# Patient Record
Sex: MALE | Race: ASIAN | URBAN - METROPOLITAN AREA
[De-identification: names, ages, dates, MRNs, and addresses within clinical notes are randomized per-mention and may not be internally consistent; named-entity substitution may affect disease eponyms.]

---

## 2017-10-31 ENCOUNTER — OFFICE VISIT (OUTPATIENT)
Dept: FAMILY MEDICINE | Facility: CLINIC | Age: 27
End: 2017-10-31

## 2017-10-31 VITALS
SYSTOLIC BLOOD PRESSURE: 111 MMHG | DIASTOLIC BLOOD PRESSURE: 68 MMHG | HEART RATE: 86 BPM | WEIGHT: 195 LBS | HEIGHT: 68 IN | RESPIRATION RATE: 19 BRPM | OXYGEN SATURATION: 94 % | BODY MASS INDEX: 29.55 KG/M2 | TEMPERATURE: 97.8 F

## 2017-10-31 DIAGNOSIS — M10.9 GOUT OF MULTIPLE SITES, UNSPECIFIED CAUSE, UNSPECIFIED CHRONICITY: Primary | ICD-10-CM

## 2017-10-31 LAB — URATE SERPL-MCNC: 8 MG/DL (ref 3–8)

## 2017-10-31 RX ORDER — ALLOPURINOL 100 MG/1
100 TABLET ORAL DAILY
Qty: 30 TABLET | Refills: 1 | Status: SHIPPED | OUTPATIENT
Start: 2017-10-31 | End: 2017-11-21

## 2017-10-31 RX ORDER — NAPROXEN 500 MG/1
500 TABLET ORAL 2 TIMES DAILY WITH MEALS
Qty: 60 TABLET | Refills: 1 | Status: SHIPPED | OUTPATIENT
Start: 2017-10-31 | End: 2017-11-21

## 2017-10-31 NOTE — PROGRESS NOTES
"       HPI:       Nikki Troy is a 27 year old  male with a significant past medical history of Gout who presents for the new concern(s) of    Right Wrist Swelling  About 5 days ago, he noticed his right wrist starting to swell and became painful both to movement and touch. Described the pain as an achiness localized to his wrist which worsen with touch and wrist movement. Notice swelling of the right wrist and hand compared to his left hand. Has trouble with gripping due to pain but denies any weakness or numbness. Tried 3 Aleve without relief. Denies any trauma or injuries. History of similar swelling in the past with previous episode of gout attacks. Was on allopurinol for several years but stopped 1 month ago when his friend recommended \"[place]onchi Water\" for gout prevention. He was also previously on numerous Hmong herbs but stopped 1 month ago. Was seen by Dr Nair several months ago and was given prednisone but decided to stop given research at home and concerns about side effects. Would like to avoid steroid if possible. Has been working on his diet (eating less pork, but more difficulties due to eating fast food while work as ). Has cut down his EtOH use to 1-2 beer once per month. Extensive family history of gout in maternal and paternal fathers and siblings.         PMHX:     There is no problem list on file for this patient.      Current Outpatient Prescriptions   Medication Sig Dispense Refill     naproxen (NAPROSYN) 500 MG tablet Take 1 tablet (500 mg) by mouth 2 times daily (with meals) 60 tablet 1     allopurinol (ZYLOPRIM) 100 MG tablet Take 1 tablet (100 mg) by mouth daily 30 tablet 1       Social History     Social History     Marital status: Single     Spouse name: N/A     Number of children: N/A     Years of education: N/A     Occupational History     Not on file.     Social History Main Topics     Smoking status: Current Every Day Smoker     Smokeless tobacco: Not on " "file     Alcohol use Not on file     Drug use: Not on file     Sexual activity: Not on file     Other Topics Concern     Not on file     Social History Narrative     No narrative on file        No Known Allergies    No results found for this or any previous visit (from the past 24 hour(s)).         Review of Systems:   C: NEGATIVE for fatigue, unexpected change in weight  E: NEGATIVE for acute vision problems or changes  R: NEGATIVE for significant cough or shortness of breath  CV: NEGATIVE for chest pain, palpitations or new or worsening peripheral edema  GI: NEGATIVE for new onset or worsening nausea, vomiting or diarrhea  : NEGATIVE for frequency, dysuria, or hematuria  MUSCULOSKELETAL: Right Wrist (see HPI)  N: NEGATIVE for weakness, dizziness, syncope, headaches  E: NEGATIVE for temperature intolerance, skin/hair changes  P: NEGATIVE for changes in mood or affect          Physical Exam:     Vitals:    10/31/17 1017   BP: 111/68   Pulse: 86   Resp: 19   Temp: 97.8  F (36.6  C)   TempSrc: Oral   SpO2: 94%   Weight: 195 lb (88.5 kg)   Height: 5' 8\" (172.7 cm)     Body mass index is 29.65 kg/(m^2).    GENERAL APPEARANCE: healthy, alert and no distress,  EYES: Eyes grossly normal to inspection,  PERRL  NECK: no adenopathy, no asymmetry, masses, or scars and thyroid normal to palpation  RESP: lungs clear to auscultation - no rales, rhonchi or wheezes  CV: regular rate and rhythm,  and no murmur, click,  rub or gallop  ABDOMEN: soft, nontender, without hepatosplenomegaly or masses  MS: right wrist: non-pitting edema radiating to the fingers, sever pain with all wrist ROM, left wrist normal, no skin changes  NEURO: Normal strength and tone, sensory exam grossly normal, mentation appears intact and speech normal  PSYCH: mood and affect normal/bright      Assessment and Plan     1. Gout of multiple sites, unspecified cause, unspecified chronicity  Swollen right wrist with decrease ROM due to pain. Hx of gout with " extensive family history. Previously controlled on allopurinol but has since discontinued. Diagnosed with Gout several years ago and recently seen by previous PCP with prednisone but discontinued due to concerns when researching medications. No hx of trauma, fall or injuries. Will treat acutely with NSAID with plans to restart allopurinol when acute gout resolves.  - Uric Acid ([facility])  - naproxen (NAPROSYN) 500 MG tablet; Take 1 tablet (500 mg) by mouth 2 times daily (with meals)  Dispense: 60 tablet; Refill: 1  - allopurinol (ZYLOPRIM) 100 MG tablet; Take 1 tablet (100 mg) by mouth daily  Dispense: 30 tablet; Refill: 1    Options for treatment and follow-up care were reviewed with the patient and/or guardian. Nikki Troy and/or guardian engaged in the decision making process and verbalized understanding of the options discussed and agreed with the final plan.    Paulo Miranda MD  [facility]  Pager: [phone]    Precepted today with: Lissette Ashraf DO

## 2017-10-31 NOTE — LETTER
November 3, 2017      Nikki Leónminor  [address]  [place] [address]        Dear Nikki,    Thank you for visiting our clinic. The result of your most recent labs (Uric Acid) has returned and was 8.0 which is the upper limit of normal. We obtained this value as a baseline but would like to recheck in several weeks when you are back on Allopurinol for some time. Our target goal of your uric acid level should be below 6.0 which the allopurinol will help. We will discuss this further at your next visit and look forward to seeing you again.    Please see below for your test results.    Resulted Orders   Uric Acid ([facility])   Result Value Ref Range    Uric Acid 8.0 3.0 - 8.0 mg/dL    Narrative    Test performed by:  [facility]  [address] [place] [address]       If you have any questions, please call the clinic to make an appointment.    Sincerely,    Paulo Miranda MD

## 2017-10-31 NOTE — MR AVS SNAPSHOT
After Visit Summary   10/31/2017    Nikki Troy    MRN: [id number]           Patient Information     Date Of Birth          1990        Visit Information        Provider Department      10/31/2017 10:20 AM Paulo Miranda MD [facility]        Today's Diagnoses     Gout of multiple sites, unspecified cause, unspecified chronicity    -  1      Care Instructions    Take Naproxen twice per day with food until gout symptoms resolved. Take Allopurinol when stable and follow up in clinic in 3 weeks.      Treating Gout Attacks     Raising the joint above the level of your heart can help reduce gout symptoms.     Gout is a disease that affects the joints. It is caused by excess uric acid in your blood stream that may lead to crystals forming in your joints. Left untreated, it can lead to painful foot and joint deformities and even kidney problems. But, by treating gout early, you can relieve pain and help prevent future problems. Gout can usually be treated with medication and proper diet. In severe cases, surgery may be needed.  Gout attacks are painful and often happen more than once. Taking medications may reduce pain and prevent attacks in the future. There are also some things you can do at home to relieve symptoms.  Medications for gout  Your healthcare provider may prescribe a daily medication to reduce levels of uric acid. Reducing your uric acid levels may help prevent gout attacks. Allopurinol is one commonly used medication taken daily to reduce uric acid levels. Other medications can help relieve pain and swelling during an acute attack. Medicines such as NSAIDs (nonsteroidal anti-inflammatory medicines), steroids, and colchicine may be prescribed for intermittent use to relieve an acute gout attack. Be sure to take your medication as directed.  What you can do  Below are some things you can do at home to relieve gout symptoms. Your healthcare provider may have other  tips.    Rest the painful joint as much as you can.    Raise the painful joint so it is at a level higher than your heart.    Use ice for 10 minutes every 1-2 hours as possible.  How can I prevent gout?  With a little effort, you may be able to prevent gout attacks in the future. Here are some things you can do:    Avoid foods high in purines    Certain meats (red meat, processed meat, turkey)    Organ meats (kidney, liver, sweetbread)    Shellfish (lobster, crab, shrimp, scallop, mussel)    Certain fish (anchovy, sardine, herring, mackerel)    Take any medications prescribed by your healthcare provider.    Lose weight if you need to.    Reduce high fructose corn syrup in meals and drinks.    Reduce or eliminate consumption of alcohol, particularly beer, but also red wine and spirits.    Control blood pressure, diabetes, and cholesterol.    Drink plenty of water to help flush uric acid from your body.  Date Last Reviewed: 2/1/2016 2000-2017 [employer]. [address] [place] [address]. All rights reserved. This information is not intended as a substitute for professional medical care. Always follow your healthcare professional's instructions.        Eating to Prevent Gout  Gout is a painful form of arthritis caused by an excess of uric acid. This is a waste product made by the body. It builds up in the body and forms crystals that collect in the joints, bringing on a gout attack. Alcohol and certain foods can trigger a gout attack. Below are some guidelines for changing your diet to help you manage gout. Your healthcare provider can work with you to determine the best eating plan for you. Know that diet is only one part of managing gout. Take your medicines as prescribed and follow the other guidelines your healthcare provider has given you.  Foods to limit  Eating too many foods containing purines may increase the levels of uric acid in your body and increase your risk for a gout attack.  It may be best to limit these high-purine foods:    Alcohol (beer, red wine). You may be told to avoid alcohol completely.    Certain fish (anchovies, sardines, fish roes, herring, tuna, mussels, codfish, scallops, trout, and ze)    Certain meats (red meat, processed meat, rosas, turkey, wild game, and goose)    Sauces and gravies made with meat    Organ meats (such as liver, kidneys, sweetbreads, and tripe)    Legumes (such as dried beans, peas)    Mushrooms, spinach, asparagus, and cauliflower    Yeast and yeast extract supplements  Foods to try  Some foods may be helpful for people with gout. You may want to try adding some of the following foods to your diet:    Dark berries: These include blueberries, blackberries, and cherries. These berries contain chemicals that may lower uric acid.    Tofu: Tofu, which is made from soy, is a good source of protein. Studies have shown that it may be a better choice than meat for people with gout.    Omega fatty acids: These acids are found in fatty fish (such as salmon), certain oils (such as flax, olive, or nut oils), or nuts. They may help prevent inflammation due to gout.  The following guidelines are recommended by the American Medical Association for people with gout. Your diet should be:    High in fiber, whole grains, fruits, and vegetables.    Low in protein (15% of calories should come from protein. Choose lean sources such as soy, lean meats, and poultry).    Low in fat (no more than 30% of calories should come from fat, with only 10% coming from animal fat).   Date Last Reviewed: 6/17/2015 2000-2017 [employer]. [address] [place] [address], [place] [address]. All rights reserved. This information is not intended as a substitute for professional medical care. Always follow your healthcare professional's instructions.                Follow-ups after your visit        Follow-up notes from your care team     Return in about 3 weeks (around 11/21/2017)  "for Gout.      Who to contact     Please call your clinic at [phone] to:    Ask questions about your health    Make or cancel appointments    Discuss your medicines    Learn about your test results    Speak to your doctor   If you have compliments or concerns about an experience at your clinic, or if you wish to file a complaint, please contact [facility] Patient Relations at [phone] or email us at [email or web address]         Additional Information About Your Visit        [employer] Information     "[employer] is an electronic gateway that provides easy, online access to your medical records. With "[employer] you can request a clinic appointment, read your test results, renew a prescription or communicate with your care team.     To sign up for "[employer] visit the website at [email or web address]   You will be asked to enter the access code listed below, as well as some personal information. Please follow the directions to create your username and password.     Your access code is: [email or web address]  Expires: 2018 10:21 AM     Your access code will  in 90 days. If you need help or a new code, please contact your [facility] or call [phone] for assistance.        Care EveryWhere ID     This is your Care EveryWhere ID. This could be used by other organizations to access your [facility] medical records  [id number]        Your Vitals Were     Pulse Temperature Respirations Height Pulse Oximetry BMI (Body Mass Index)    86 97.8  F (36.6  C) (Oral) 19 5' 8\" (172.7 cm) 94% 29.65 kg/m2       Blood Pressure from Last 3 Encounters:   10/31/17 111/68    Weight from Last 3 Encounters:   10/31/17 195 lb (88.5 kg)              We Performed the Following     Uric Acid ([employer])          Today's Medication Changes          These changes are accurate as of: 10/31/17 11:25 AM.  If you have any questions, ask your nurse or doctor.             "   Start taking these medicines.        Dose/Directions    allopurinol 100 MG tablet   Commonly known as:  ZYLOPRIM   Used for:  Gout of multiple sites, unspecified cause, unspecified chronicity   Started by:  Paulo Miranda MD        Dose:  100 mg   Take 1 tablet (100 mg) by mouth daily   Quantity:  30 tablet   Refills:  1       naproxen 500 MG tablet   Commonly known as:  NAPROSYN   Used for:  Gout of multiple sites, unspecified cause, unspecified chronicity   Started by:  Paulo Miranda MD        Dose:  500 mg   Take 1 tablet (500 mg) by mouth 2 times daily (with meals)   Quantity:  60 tablet   Refills:  1            Where to get your medicines      These medications were sent to [facility] - [place], [place] - [address], [place] [address]     Phone:  [phone]     allopurinol 100 MG tablet    naproxen 500 MG tablet                Primary Care Provider Office Phone # Fax #    Paulo Miranda [phone]       [address] [address]        Equal Access to Services     [facility] Center[facility] : Hadii agustin ku hadasho Soomaali, waaxda luqadaha, qaybta kaalmada adeegyada, mary doan hayelian mansi barahona . So [facility] [phone].    ATENCIÓN: Si habla español, tiene a giles disposición servicios gratuitos de asistencia lingüística. [facility] [phone].    We comply with applicable federal civil rights laws and [place] laws. We do not discriminate on the basis of race, color, national origin, age, disability, sex, sexual orientation, or gender identity.            Thank you!     Thank you for choosing [facility]  for your care. Our goal is always to provide you with excellent care. Hearing back from our patients is one way we can continue to improve our services. Please take a few minutes to complete the written survey that you may receive in the mail after your visit with us.  Thank you!             Your Updated Medication List - Protect others around you: Learn how to safely use, store and throw away your medicines at www.disposemymeds.org.          This list is accurate as of: 10/31/17 11:25 AM.  Always use your most recent med list.                   Brand Name Dispense Instructions for use Diagnosis    allopurinol 100 MG tablet    ZYLOPRIM    30 tablet    Take 1 tablet (100 mg) by mouth daily    Gout of multiple sites, unspecified cause, unspecified chronicity       naproxen 500 MG tablet    NAPROSYN    60 tablet    Take 1 tablet (500 mg) by mouth 2 times daily (with meals)    Gout of multiple sites, unspecified cause, unspecified chronicity

## 2017-10-31 NOTE — PATIENT INSTRUCTIONS
Take Naproxen twice per day with food until gout symptoms resolved. Take Allopurinol when stable and follow up in clinic in 3 weeks.      Treating Gout Attacks     Raising the joint above the level of your heart can help reduce gout symptoms.     Gout is a disease that affects the joints. It is caused by excess uric acid in your blood stream that may lead to crystals forming in your joints. Left untreated, it can lead to painful foot and joint deformities and even kidney problems. But, by treating gout early, you can relieve pain and help prevent future problems. Gout can usually be treated with medication and proper diet. In severe cases, surgery may be needed.  Gout attacks are painful and often happen more than once. Taking medications may reduce pain and prevent attacks in the future. There are also some things you can do at home to relieve symptoms.  Medications for gout  Your healthcare provider may prescribe a daily medication to reduce levels of uric acid. Reducing your uric acid levels may help prevent gout attacks. Allopurinol is one commonly used medication taken daily to reduce uric acid levels. Other medications can help relieve pain and swelling during an acute attack. Medicines such as NSAIDs (nonsteroidal anti-inflammatory medicines), steroids, and colchicine may be prescribed for intermittent use to relieve an acute gout attack. Be sure to take your medication as directed.  What you can do  Below are some things you can do at home to relieve gout symptoms. Your healthcare provider may have other tips.    Rest the painful joint as much as you can.    Raise the painful joint so it is at a level higher than your heart.    Use ice for 10 minutes every 1-2 hours as possible.  How can I prevent gout?  With a little effort, you may be able to prevent gout attacks in the future. Here are some things you can do:    Avoid foods high in purines    Certain meats (red meat, processed meat, turkey)    Organ meats  (kidney, liver, sweetbread)    Shellfish (lobster, crab, shrimp, scallop, mussel)    Certain fish (anchovy, sardine, herring, mackerel)    Take any medications prescribed by your healthcare provider.    Lose weight if you need to.    Reduce high fructose corn syrup in meals and drinks.    Reduce or eliminate consumption of alcohol, particularly beer, but also red wine and spirits.    Control blood pressure, diabetes, and cholesterol.    Drink plenty of water to help flush uric acid from your body.  Date Last Reviewed: 2/1/2016 2000-2017 [employer]. [address] [place], [place] [id number]. All rights reserved. This information is not intended as a substitute for professional medical care. Always follow your healthcare professional's instructions.        Eating to Prevent Gout  Gout is a painful form of arthritis caused by an excess of uric acid. This is a waste product made by the body. It builds up in the body and forms crystals that collect in the joints, bringing on a gout attack. Alcohol and certain foods can trigger a gout attack. Below are some guidelines for changing your diet to help you manage gout. Your healthcare provider can work with you to determine the best eating plan for you. Know that diet is only one part of managing gout. Take your medicines as prescribed and follow the other guidelines your healthcare provider has given you.  Foods to limit  Eating too many foods containing purines may increase the levels of uric acid in your body and increase your risk for a gout attack. It may be best to limit these high-purine foods:    Alcohol (beer, red wine). You may be told to avoid alcohol completely.    Certain fish (anchovies, sardines, fish roes, herring, tuna, mussels, codfish, scallops, trout, and ze)    Certain meats (red meat, processed meat, rosas, turkey, wild game, and goose)    Sauces and gravies made with meat    Organ meats (such as liver, kidneys, sweetbreads, and  tripe)    Legumes (such as dried beans, peas)    Mushrooms, spinach, asparagus, and cauliflower    Yeast and yeast extract supplements  Foods to try  Some foods may be helpful for people with gout. You may want to try adding some of the following foods to your diet:    Dark berries: These include blueberries, blackberries, and cherries. These berries contain chemicals that may lower uric acid.    Tofu: Tofu, which is made from soy, is a good source of protein. Studies have shown that it may be a better choice than meat for people with gout.    Omega fatty acids: These acids are found in fatty fish (such as salmon), certain oils (such as flax, olive, or nut oils), or nuts. They may help prevent inflammation due to gout.  The following guidelines are recommended by the American Medical Association for people with gout. Your diet should be:    High in fiber, whole grains, fruits, and vegetables.    Low in protein (15% of calories should come from protein. Choose lean sources such as soy, lean meats, and poultry).    Low in fat (no more than 30% of calories should come from fat, with only 10% coming from animal fat).   Date Last Reviewed: 6/17/2015 2000-2017 [employer]. [address] [place] [address], [place] [address]. All rights reserved. This information is not intended as a substitute for professional medical care. Always follow your healthcare professional's instructions.

## 2017-10-31 NOTE — LETTER
[employer] Customer Service  [facility]  [address] , [address]  [place] [address]  Fax: [phone]  Phone: [phone]      2017      Nikki Troy  [address] [place] [address]        Dear Nikki,    Thank you for your interest in becoming a [employer] user!    Your access code is: [email or web address]  Expires: 2018 10:21 AM     Please access the [employer] website at [email or web address].  Below the ID and password fields, select the  Sign Up Now  as New User.  You will be prompted to enter the access code listed above as well as additional personal information.  Please follow the directions carefully when creating your username and password.    If you allow your access code to , or if you have any questions please call a [employer] Representative at [phone] during normal clinic hours.     Sincerely,      [employer] Customer Service  [facility]

## 2017-10-31 NOTE — PROGRESS NOTES
Preceptor Attestation:  Patient's case reviewed and discussed with Paulo Miranda MD Patient seen and discussed with the resident.. I agree with assessment and plan of care.  Supervising Physician:  Lissette Ashraf DO  [facility]

## 2017-10-31 NOTE — LETTER
RETURN TO WORK/SCHOOL FORM    10/31/2017    Re: Nikki Troy  1990      To Whom It May Concern:     Nikki Troy was seen in clinic today.  He may return to work without restrictions on 11/6/17.        Restrictions:  None    Paulo Miranda MD  10/31/2017 11:27 AM

## 2017-11-21 ENCOUNTER — OFFICE VISIT (OUTPATIENT)
Dept: FAMILY MEDICINE | Facility: CLINIC | Age: 27
End: 2017-11-21

## 2017-11-21 VITALS
RESPIRATION RATE: 18 BRPM | BODY MASS INDEX: 29.4 KG/M2 | HEIGHT: 68 IN | TEMPERATURE: 97.7 F | WEIGHT: 194 LBS | OXYGEN SATURATION: 97 % | HEART RATE: 68 BPM | DIASTOLIC BLOOD PRESSURE: 72 MMHG | SYSTOLIC BLOOD PRESSURE: 117 MMHG

## 2017-11-21 DIAGNOSIS — M10.9 GOUT OF MULTIPLE SITES, UNSPECIFIED CAUSE, UNSPECIFIED CHRONICITY: ICD-10-CM

## 2017-11-21 RX ORDER — ALLOPURINOL 100 MG/1
300 TABLET ORAL DAILY
Qty: 90 TABLET | Refills: 3 | Status: SHIPPED | OUTPATIENT
Start: 2017-11-21 | End: 2018-09-21

## 2017-11-21 RX ORDER — INDOMETHACIN 25 MG/1
50 CAPSULE ORAL
Qty: 84 CAPSULE | Refills: 0 | Status: SHIPPED | OUTPATIENT
Start: 2017-11-21

## 2017-11-21 NOTE — MR AVS SNAPSHOT
After Visit Summary   11/21/2017    Nikki Troy    MRN: [id number]           Patient Information     Date Of Birth          1990        Visit Information        Provider Department      11/21/2017 3:20 PM Pauline Montemayor MD [facility]        Today's Diagnoses     Gout of multiple sites, unspecified cause, unspecified chronicity          Care Instructions      Follow up 1 month after you are taking your allopurinol     Gout Diet  Gout is a painful condition caused by an excess of uric acid, a waste product made by the body. Uric acid forms crystals that collect in the joints. The immune response to these crystals brings on symptoms of joint pain and swelling. This is called a gout attack. Often, medications and diet changes are combined to manage gout. Below are some guidelines for changing your diet to help you manage gout and prevent attacks. Your health care provider will help you determine the best eating plan for you.     Eating to manage gout  Weight loss for those who are overweight may help reduce gout attacks.  Eat less of these foods  Eating too many foods containing purines may raise the levels of uric acid in your body. This raises your risk for a gout attack. Try to limit these foods and drinks:    Alcohol, such as beer and red wine. You may be told to avoid alcohol completely.    Soft drinks that contain sugar or high fructose corn syrup    Certain fish, including anchovies, sardines, fish eggs, and herring    Shellfish    Certain meats, such as red meat, hot dogs, luncheon meats, and turkey    Organ meats, such as liver, kidneys, and sweetbreads    Legumes, such as dried beans and peas    Other high fat foods such as gravy, whole milk, and high fat cheeses    Vegetables such as asparagus, cauliflower, spinach, and mushrooms used to be thought to contribute to an increased risk for a gout attack, but recent studies show that high purine vegetables don't increase the  risk for a gout attack.  Eat more of these foods  Other foods may be helpful for people with gout. Add some of these foods to your diet:    Cherries contain chemicals that may lower uric acid.    Omega fatty acids. These are found in some fatty fish such as salmon, certain oils (flax, olive, or nut), and nuts themselves. Omega fatty acids may help prevent inflammation due to gout.    Dairy products that are low-fat or fat-free, such as cheese and yogurt    Complex carbohydrate foods, including whole grains, brown rice, oats, and beans    Coffee, in moderation    Water, approximately 64 ounces per day  Follow-up care  Follow up with your healthcare provider as advised.  When to seek medical advice  Call your healthcare provider right away if any of these occur:    Return of gout symptoms, usually at night:    Severe pain, swelling, and heat in a joint, especially the base of the big toe    Affected joint is hard to move    Skin of the affected joint is purple or red    Fever of 100.4 F (38 C) or higher    Pain that doesn't get better even with prescribed medicine   Date Last Reviewed: 1/12/2016 2000-2017 [employer]. [address] [place] [address]. All rights reserved. This information is not intended as a substitute for professional medical care. Always follow your healthcare professional's instructions.                Follow-ups after your visit        Who to contact     Please call your clinic at [phone] to:    Ask questions about your health    Make or cancel appointments    Discuss your medicines    Learn about your test results    Speak to your doctor   If you have compliments or concerns about an experience at your clinic, or if you wish to file a complaint, please contact [facility] Patient Relations at [phone] or email us at [email or web address]         Additional Information About Your Visit        MyChart Information     MyChart is  "an electronic gateway that provides easy, online access to your medical records. With Cell[employer], you can request a clinic appointment, read your test results, renew a prescription or communicate with your care team.     To sign up for CellControl visit the website at [email or web address]   You will be asked to enter the access code listed below, as well as some personal information. Please follow the directions to create your username and password.     Your access code is: [email or web address]  Expires: 2018  9:21 AM     Your access code will  in 90 days. If you need help or a new code, please contact your [facility] or call [phone] for assistance.        Care EveryWhere ID     This is your Care EveryWhere ID. This could be used by other organizations to access your [facility] medical records  [id number]        Your Vitals Were     Pulse Temperature Respirations Height Pulse Oximetry BMI (Body Mass Index)    68 97.7  F (36.5  C) (Oral) 18 5' 7.5\" (171.5 cm) 97% 29.94 kg/m2       Blood Pressure from Last 3 Encounters:   17 117/72   10/31/17 111/68    Weight from Last 3 Encounters:   17 194 lb (88 kg)   10/31/17 195 lb (88.5 kg)              Today, you had the following     No orders found for display         Today's Medication Changes          These changes are accurate as of: 17  4:15 PM.  If you have any questions, ask your nurse or doctor.               Start taking these medicines.        Dose/Directions    indomethacin 25 MG capsule   Commonly known as:  INDOCIN   Used for:  Gout of multiple sites, unspecified cause, unspecified chronicity   Started by:  Pauline Montemayor MD        Dose:  50 mg   Take 2 capsules (50 mg) by mouth 3 times daily (with meals)   Quantity:  84 capsule   Refills:  0         Stop taking these medicines if you haven't already. Please contact your care team if you have questions.     naproxen 500 MG tablet   Commonly known as:  " TIAGO   Stopped by:  Pauline Montemayor MD                Where to get your medicines      [facility] - [place], [place] - [address] AT [facility] & [place] [address], [place] [address]     Phone:  [phone]     allopurinol 100 MG tablet    indomethacin 25 MG capsule                Primary Care Provider Office Phone # Fax #    Paulo Lucian Miranda [phone]       [address] [address]        Equal Access to Services     [facility]: Hadii aad ku hadasho Soomaali, waaxda luqadaha, qaybta kaalmada adeegyada, waxshirley handin hayabby barahona . So [facility] [phone].    ATENCIÓN: Si habla español, tiene a giles disposición servicios gratuitos de asistencia lingüística. [facility] [phone].    We comply with applicable federal civil rights laws and [place] laws. We do not discriminate on the basis of race, color, national origin, age, disability, sex, sexual orientation, or gender identity.            Thank you!     Thank you for choosing [facility]  for your care. Our goal is always to provide you with excellent care. Hearing back from our patients is one way we can continue to improve our services. Please take a few minutes to complete the written survey that you may receive in the mail after your visit with us. Thank you!             Your Updated Medication List - Protect others around you: Learn how to safely use, store and throw away your medicines at www.disposemymeds.org.          This list is accurate as of: 11/21/17  4:15 PM.  Always use your most recent med list.                   Brand Name Dispense Instructions for use Diagnosis    allopurinol 100 MG tablet    ZYLOPRIM    90 tablet    Take 3 tablets (300 mg) by mouth daily    Gout of multiple sites, unspecified cause, unspecified chronicity       indomethacin 25 MG capsule    INDOCIN    84 capsule    Take 2 capsules (50  mg) by mouth 3 times daily (with meals)    Gout of multiple sites, unspecified cause, unspecified chronicity

## 2017-11-21 NOTE — PROGRESS NOTES
"       HPI:       Nikki Troy is a 27 year old  male with a significant past medical history of gout who presents for follow up of concern(s) listed below    Gout  - Inflammation of right wrist. Feels like gout is flaring again. Was getting slightly better since his last clinic visit 10/31, but has since flared again. He thinks he was working too hard at work which caused the acute worsening.  - This flare started 10/26. Has had flares 3 times already this year. Last flare in July. Was diagnosed with gout at the age of 24. Previously had flares 3-4 times a year and his gout has become more chronic. Usually left or right big toe, but has had it in ankles, knees, and wrists prior.   - Currently taking Naproxen 1 tablet BID   - Was previously taking Allopurinol 300mg daily, but has not been taking this since September and knows he should not take this with his flares. Had stopped it 1 month prior to his attack as he wanted to try \"Jaylin water\".   - Diet and exercise has been helpful in the past to control.   - Prednisone has been helpful but does not like the side effects.   - Indomethacin has been helpful in the past and would like to try this again           PMHX:     Patient Active Problem List   Diagnosis     Gout of multiple sites, unspecified cause, unspecified chronicity     Current Outpatient Prescriptions   Medication Sig Dispense Refill     naproxen (NAPROSYN) 500 MG tablet Take 1 tablet (500 mg) by mouth 2 times daily (with meals) 60 tablet 1     allopurinol (ZYLOPRIM) 100 MG tablet Take 1 tablet (100 mg) by mouth daily (Patient taking differently: Take 300 mg by mouth daily ) 30 tablet 1      No Known Allergies    Uric Acid 10/31/17: 8          Review of Systems:   ROS  GEN: no weight gain/loss, fevers, chills.   LUNGS: no dyspnea, wheeze, cough  COR: no chest pain, palpitations, PND          Physical Exam:     Vitals:    11/21/17 1526   BP: 117/72   Pulse: 68   Resp: 18   Temp: 97.7  F (36.5  C) " "  TempSrc: Oral   SpO2: 97%   Weight: 194 lb (88 kg)   Height: 5' 7.5\" (171.5 cm)     Body mass index is 29.94 kg/(m^2).    Exam:  Constitutional: healthy, alert and no distress  Cardiovascular: Regular rate and rhythm. No murmurs, clicks gallops or rub  Respiratory: Lungs clear to auscultation. No wheezing or crackles present   Musculoskeletal: Swelling and erythema of the right wrist with decreased ROM, limited by pain. Pain to palpation of the posterior wrist over the joint line.  Skin: no suspicious lesions or rashes    Assessment and Plan     1. Gout of multiple sites, unspecified cause, unspecified chronicity  Swelling and erythema of the right wrist with decreased ROM, limited by pain as well as pain to palpation over the joint line. He has a history of gout since 24 and an extensive family history of gout. Has previously been on allopurinol 300mg daily, but stopped taking in September. No history of trauma, falls, injuries. Was prescribed naproxen 500mg BID at last clinic visit and feels this is not helping and he is continuing to have a flare. Uric Acid of 8 on 10/31 during flare. As he is continuing to have a flare for a month we will try indomethacin. He has no previous history of ulcers or GI bleeds. He was instructed to take the medication with food, avoid alcohol, smoking, or any foods which would increase stomach acidity. Was told to discontinue naproxen and avoid all other NSAIDs while taking indomethacin. Instructed to stop taking the medication with any abdominal pain, blood in stools, or hematemesis.   - allopurinol (ZYLOPRIM) 100 MG tablet; Take 3 tablets (300 mg) by mouth daily  Dispense: 90 tablet; Refill: 3  - indomethacin (INDOCIN) 25 MG capsule; Take 2 capsules (50 mg) by mouth 3 times daily (with meals)  Dispense: 84 capsule; Refill: 0  - Reviewed diet changes with gout and given a handout  - Will resume his allopurinol after this flare is over.  - Follow up in clinic 1 month after taking " allopurinol. Possibly recheck uric acid at that time to see how well his gout is controlled.   - Return to clinic if symptoms are not improving after 2 weeks on indomethacin.     Options for treatment and follow-up care were reviewed with the patient and/or guardian. Txzoe Traoreelikennyyudi and/or guardian engaged in the decision making process and verbalized understanding of the options discussed and agreed with the final plan.    Pauline Montemayor DO (PGY1)   Pager #[phone]    Precepted today with: Layne Kendrick MD

## 2017-11-21 NOTE — PATIENT INSTRUCTIONS
Follow up 1 month after you are taking your allopurinol     Gout Diet  Gout is a painful condition caused by an excess of uric acid, a waste product made by the body. Uric acid forms crystals that collect in the joints. The immune response to these crystals brings on symptoms of joint pain and swelling. This is called a gout attack. Often, medications and diet changes are combined to manage gout. Below are some guidelines for changing your diet to help you manage gout and prevent attacks. Your health care provider will help you determine the best eating plan for you.     Eating to manage gout  Weight loss for those who are overweight may help reduce gout attacks.  Eat less of these foods  Eating too many foods containing purines may raise the levels of uric acid in your body. This raises your risk for a gout attack. Try to limit these foods and drinks:    Alcohol, such as beer and red wine. You may be told to avoid alcohol completely.    Soft drinks that contain sugar or high fructose corn syrup    Certain fish, including anchovies, sardines, fish eggs, and herring    Shellfish    Certain meats, such as red meat, hot dogs, luncheon meats, and turkey    Organ meats, such as liver, kidneys, and sweetbreads    Legumes, such as dried beans and peas    Other high fat foods such as gravy, whole milk, and high fat cheeses    Vegetables such as asparagus, cauliflower, spinach, and mushrooms used to be thought to contribute to an increased risk for a gout attack, but recent studies show that high purine vegetables don't increase the risk for a gout attack.  Eat more of these foods  Other foods may be helpful for people with gout. Add some of these foods to your diet:    Cherries contain chemicals that may lower uric acid.    Omega fatty acids. These are found in some fatty fish such as salmon, certain oils (flax, olive, or nut), and nuts themselves. Omega fatty acids may help prevent inflammation due to gout.    Dairy  products that are low-fat or fat-free, such as cheese and yogurt    Complex carbohydrate foods, including whole grains, brown rice, oats, and beans    Coffee, in moderation    Water, approximately 64 ounces per day  Follow-up care  Follow up with your healthcare provider as advised.  When to seek medical advice  Call your healthcare provider right away if any of these occur:    Return of gout symptoms, usually at night:    Severe pain, swelling, and heat in a joint, especially the base of the big toe    Affected joint is hard to move    Skin of the affected joint is purple or red    Fever of 100.4 F (38 C) or higher    Pain that doesn't get better even with prescribed medicine   Date Last Reviewed: 1/12/2016 2000-2017 [employer]. [address] [place] [address], [place] [address]. All rights reserved. This information is not intended as a substitute for professional medical care. Always follow your healthcare professional's instructions.

## 2017-12-11 NOTE — PROGRESS NOTES
Preceptor Attestation:  Patient's case reviewed and discussed with Pauline Montemayor MD.   Patient seen and discussed with the resident.   I agree with assessment and plan of care.  Supervising Physician:  Layne Kendrick MD  [facility]

## 2018-09-21 ENCOUNTER — OFFICE VISIT (OUTPATIENT)
Dept: FAMILY MEDICINE | Facility: CLINIC | Age: 28
End: 2018-09-21
Payer: COMMERCIAL

## 2018-09-21 VITALS
HEIGHT: 67 IN | HEART RATE: 65 BPM | WEIGHT: 200 LBS | DIASTOLIC BLOOD PRESSURE: 80 MMHG | TEMPERATURE: 98.3 F | RESPIRATION RATE: 18 BRPM | OXYGEN SATURATION: 98 % | SYSTOLIC BLOOD PRESSURE: 119 MMHG | BODY MASS INDEX: 31.39 KG/M2

## 2018-09-21 DIAGNOSIS — M25.571 ACUTE RIGHT ANKLE PAIN: Primary | ICD-10-CM

## 2018-09-21 DIAGNOSIS — M10.9 GOUT OF MULTIPLE SITES, UNSPECIFIED CAUSE, UNSPECIFIED CHRONICITY: ICD-10-CM

## 2018-09-21 RX ORDER — ALLOPURINOL 100 MG/1
300 TABLET ORAL DAILY
Qty: 90 TABLET | Refills: 3 | Status: SHIPPED | OUTPATIENT
Start: 2018-09-21

## 2018-09-21 NOTE — MR AVS SNAPSHOT
"              After Visit Summary   2018    Nikki Troy    MRN: [id number]           Patient Information     Date Of Birth          1990        Visit Information        Provider Department      2018 4:20 PM Paulo Miranda MD [facility]        Today's Diagnoses     Gout of multiple sites, unspecified cause, unspecified chronicity          Care Instructions    - Continue to use Aleve  - Use ankle brace  - stretch as demonstrated  - Follow up as needed          Follow-ups after your visit        Who to contact     Please call your clinic at [phone] to:    Ask questions about your health    Make or cancel appointments    Discuss your medicines    Learn about your test results    Speak to your doctor            Additional Information About Your Visit        MyChart Information     Savedaily is an electronic gateway that provides easy, online access to your medical records. With Savedaily, you can request a clinic appointment, read your test results, renew a prescription or communicate with your care team.     To sign up for Savedaily visit the website at [email or web address]   You will be asked to enter the access code listed below, as well as some personal information. Please follow the directions to create your username and password.     Your access code is: [email or web address]  Expires: 2018  4:03 PM     Your access code will  in 90 days. If you need help or a new code, please contact your [facility] or call [phone] for assistance.        Care EveryWhere ID     This is your Care EveryWhere ID. This could be used by other organizations to access your [facility] medical records  [id number]        Your Vitals Were     Pulse Temperature Respirations Height Pulse Oximetry BMI (Body Mass Index)    65 98.3  F (36.8  C) (Oral) 18 5' 7.13\" (170.5 cm) 98% 31.21 kg/m2       Blood Pressure from Last 3 Encounters:   18 119/80 "   11/21/17 117/72   10/31/17 111/68    Weight from Last 3 Encounters:   09/21/18 200 lb (90.7 kg)   11/21/17 194 lb (88 kg)   10/31/17 195 lb (88.5 kg)              Today, you had the following     No orders found for display         Where to get your medicines      These medications were sent to [facility] - [place], [place] - [address], [place] [address]     Phone:  [phone]     allopurinol 100 MG tablet          Primary Care Provider Office Phone # Fax #    Paulo Lucian Miranda [phone]       [address] [address]        Equal Access to Services     SHARA VERA : Hadii aad ku hadasho Soshanel, waaxda luqadaha, qaybta kaalmada adeegyada, mary faye. So [facility] [phone].    ATENCIÓN: Si habla español, tiene a giles disposición servicios gratuitos de asistencia lingüística. [facility] [phone].    We comply with applicable federal civil rights laws and [place] laws. We do not discriminate on the basis of race, color, national origin, age, disability, sex, sexual orientation, or gender identity.            Thank you!     Thank you for choosing [facility]  for your care. Our goal is always to provide you with excellent care. Hearing back from our patients is one way we can continue to improve our services. Please take a few minutes to complete the written survey that you may receive in the mail after your visit with us. Thank you!             Your Updated Medication List - Protect others around you: Learn how to safely use, store and throw away your medicines at www.disposemymeds.org.          This list is accurate as of 9/21/18  5:01 PM.  Always use your most recent med list.                   Brand Name Dispense Instructions for use Diagnosis    allopurinol 100 MG tablet    ZYLOPRIM    90 tablet    Take 3 tablets (300 mg) by mouth daily    Gout of  multiple sites, unspecified cause, unspecified chronicity       indomethacin 25 MG capsule    INDOCIN    84 capsule    Take 2 capsules (50 mg) by mouth 3 times daily (with meals)    Gout of multiple sites, unspecified cause, unspecified chronicity

## 2018-09-21 NOTE — PROGRESS NOTES
HPI:       Txenyi Thang Troy is a 28 year old male with a significant past medical history of gout who presents for the new concern(s) of    Right Ankle Pain  Started about 4 days ago while he was hiking in [place]. Described the pain as a dull achiness over the posterior ligament and lateral ligament that worsen with foot inversion but not with flexion, extension or eversion. Noted worsening pain with weight bearing but has improved since onset. Started after hiking up a trail but denied any trauma or falls. Has been taking Aleve with good relief. No other medications. Has a history of gout which normally worsen when he injure his joints but has been compliant with his allopurinol. Never had anything similar in the past. Denied any weakness, numbness, difficulties with ankle movement.         PMHX:     Patient Active Problem List   Diagnosis     Gout of multiple sites, unspecified cause, unspecified chronicity       Current Outpatient Prescriptions   Medication Sig Dispense Refill     allopurinol (ZYLOPRIM) 100 MG tablet Take 3 tablets (300 mg) by mouth daily 90 tablet 3     indomethacin (INDOCIN) 25 MG capsule Take 2 capsules (50 mg) by mouth 3 times daily (with meals) (Patient not taking: Reported on 9/21/2018) 84 capsule 0       Social History     Social History     Marital status: Single     Spouse name: N/A     Number of children: N/A     Years of education: N/A     Occupational History     Not on file.     Social History Main Topics     Smoking status: Current Every Day Smoker     Types: Cigarettes     Smokeless tobacco: Never Used     Alcohol use Yes      Comment: Ocassionally     Drug use: No     Sexual activity: Not on file     Other Topics Concern     Not on file     Social History Narrative       Family History   Problem Relation Age of Onset     Diabetes No family hx of      Coronary Artery Disease No family hx of      Hypertension No family hx of      Hyperlipidemia No family hx of   "    Cerebrovascular Disease No family hx of      Breast Cancer No family hx of         No Known Allergies    No results found for this or any previous visit (from the past 24 hour(s)).         Review of Systems:   C: NEGATIVE for fatigue, unexpected change in weight  I: NEGATIVE for worrisome rashes or lesions  GI: NEGATIVE for new onset or worsening nausea, vomiting or diarrhea  MUSCULOSKELETAL: Ankle pain as above  N: NEGATIVE for weakness  P: NEGATIVE for changes in mood or affect          Physical Exam:     Vitals:    09/21/18 1630   BP: 119/80   Pulse: 65   Resp: 18   Temp: 98.3  F (36.8  C)   TempSrc: Oral   SpO2: 98%   Weight: 200 lb (90.7 kg)   Height: 5' 7.13\" (170.5 cm)     Body mass index is 31.21 kg/(m^2).    GENERAL APPEARANCE: healthy, alert and no distress,  MS: slight swelling of the medial right ankle, tenderness to deep palpation of the posteromedial ligament and lateral anterior ligament of the ankle, pain with extreme foot inversion but not eversion, dorsiflexion or plantar flexion, no weakness, normal pulses and negative calf squeeze. Full range of ROM of both ankles.  SKIN: no suspicious lesions or rashes  PSYCH: mood and affect normal/bright      Assessment and Plan     1. Acute right ankle pain  Likely musculoskeletal sprain given history and exam. No signs of ankle instability and less likely acute fracture. Discuss supportive treatment including use of Aleve, tylenol, ibuprofen, ice and heat which he is agreeable to. Did recommend using ankle brace to help with load bearing which he will . Okay to continue with work but may need restriction if not improving. Return to clinic in 4-6 weeks if not improving. Consider imaging at that time and/or physical therapy.    2. Gout of multiple sites, unspecified cause, unspecified chronicity  No recent exacerbation. Has been somewhat compliant with allopurinol taking it every other day. Discuss importance of medication compliance and recommend " taking medication everyday which he is agreeable.  - Continue with allopurinol  - allopurinol (ZYLOPRIM) 100 MG tablet; Take 3 tablets (300 mg) by mouth daily  Dispense: 90 tablet; Refill: 3      Options for treatment and follow-up care were reviewed with the patient and/or guardian. Nikki Thang Troy and/or guardian engaged in the decision making process and verbalized understanding of the options discussed and agreed with the final plan.    Paulo Miranda MD  [facility]  Pager: [phone]    Precepted today with: Joseline Koehler MD

## 2018-09-23 NOTE — PROGRESS NOTES
Preceptor Attestation:  Patient's case reviewed and discussed with Paulo Miranda MD resident and I evaluated the patient. I agree with written assessment and plan of care.  Supervising Physician:  JUDI GORDON MD  [facility]